# Patient Record
Sex: MALE | Race: WHITE | Employment: OTHER | URBAN - METROPOLITAN AREA
[De-identification: names, ages, dates, MRNs, and addresses within clinical notes are randomized per-mention and may not be internally consistent; named-entity substitution may affect disease eponyms.]

---

## 2024-01-02 ENCOUNTER — TELEPHONE (OUTPATIENT)
Dept: CARDIOLOGY CLINIC | Age: 79
End: 2024-01-02

## 2024-01-02 NOTE — TELEPHONE ENCOUNTER
Mr Pimentel's wife, Kaylynn, is a patient of [facility]. She would like to talk with him about her . He is currently in [facility] with sepsis. They wanted to remove his foot and Mr Pimentel would not allow them do that. Now they are wanting him to go into a tank. Kaylynn wants to discuss this with [facility] before giving them permission to do this. Please call to discuss.

## 2024-01-10 PROBLEM — Z01.810 PREOP CARDIOVASCULAR EXAM: Status: ACTIVE | Noted: 2024-01-10
